# Patient Record
Sex: MALE | Race: WHITE | NOT HISPANIC OR LATINO | Employment: OTHER | URBAN - METROPOLITAN AREA
[De-identification: names, ages, dates, MRNs, and addresses within clinical notes are randomized per-mention and may not be internally consistent; named-entity substitution may affect disease eponyms.]

---

## 2024-05-23 ENCOUNTER — OFFICE VISIT (OUTPATIENT)
Dept: SURGICAL ONCOLOGY | Facility: CLINIC | Age: 64
End: 2024-05-23
Payer: COMMERCIAL

## 2024-05-23 VITALS
HEART RATE: 56 BPM | HEIGHT: 69 IN | WEIGHT: 206.38 LBS | DIASTOLIC BLOOD PRESSURE: 81 MMHG | SYSTOLIC BLOOD PRESSURE: 132 MMHG | BODY MASS INDEX: 30.57 KG/M2

## 2024-05-23 DIAGNOSIS — K64.5 INTERNAL THROMBOSED HEMORRHOIDS: Primary | ICD-10-CM

## 2024-05-23 DIAGNOSIS — K64.5 THROMBOSED HEMORRHOIDS: Primary | ICD-10-CM

## 2024-05-23 DIAGNOSIS — K64.5 THROMBOSED HEMORRHOIDS: ICD-10-CM

## 2024-05-23 PROCEDURE — 99999 PR PBB SHADOW E&M-EST. PATIENT-LVL III: CPT | Mod: PBBFAC,,, | Performed by: COLON & RECTAL SURGERY

## 2024-05-23 PROCEDURE — 3008F BODY MASS INDEX DOCD: CPT | Mod: CPTII,S$GLB,, | Performed by: COLON & RECTAL SURGERY

## 2024-05-23 PROCEDURE — 3075F SYST BP GE 130 - 139MM HG: CPT | Mod: CPTII,S$GLB,, | Performed by: COLON & RECTAL SURGERY

## 2024-05-23 PROCEDURE — 1160F RVW MEDS BY RX/DR IN RCRD: CPT | Mod: CPTII,S$GLB,, | Performed by: COLON & RECTAL SURGERY

## 2024-05-23 PROCEDURE — 1159F MED LIST DOCD IN RCRD: CPT | Mod: CPTII,S$GLB,, | Performed by: COLON & RECTAL SURGERY

## 2024-05-23 PROCEDURE — 3079F DIAST BP 80-89 MM HG: CPT | Mod: CPTII,S$GLB,, | Performed by: COLON & RECTAL SURGERY

## 2024-05-23 PROCEDURE — 99204 OFFICE O/P NEW MOD 45 MIN: CPT | Mod: S$GLB,,, | Performed by: COLON & RECTAL SURGERY

## 2024-05-23 RX ORDER — LIDOCAINE 50 MG/G
CREAM TOPICAL
COMMUNITY
Start: 2024-05-23

## 2024-05-23 RX ORDER — ROSUVASTATIN CALCIUM 40 MG/1
40 TABLET, COATED ORAL
COMMUNITY
Start: 2024-05-02

## 2024-05-23 RX ORDER — AMOXICILLIN 500 MG
CAPSULE ORAL DAILY
COMMUNITY

## 2024-05-23 RX ORDER — METOPROLOL SUCCINATE 25 MG/1
12.5 TABLET, EXTENDED RELEASE ORAL
COMMUNITY
Start: 2024-05-08

## 2024-05-23 RX ORDER — DICYCLOMINE HYDROCHLORIDE 10 MG/1
CAPSULE ORAL EVERY 6 HOURS PRN
COMMUNITY
Start: 2024-02-15

## 2024-05-23 RX ORDER — HYDROCORTISONE ACETATE 25 MG/1
25 SUPPOSITORY RECTAL 2 TIMES DAILY
Qty: 20 SUPPOSITORY | Refills: 0 | Status: SHIPPED | OUTPATIENT
Start: 2024-05-23 | End: 2024-06-02

## 2024-05-23 RX ORDER — TAMSULOSIN HYDROCHLORIDE 0.4 MG/1
1 CAPSULE ORAL
COMMUNITY
Start: 2024-03-05

## 2024-05-23 RX ORDER — NAPROXEN SODIUM 220 MG/1
81 TABLET, FILM COATED ORAL DAILY
COMMUNITY

## 2024-05-23 NOTE — PROGRESS NOTES
"   Patient ID: [id number]     Chief Complaint:  THROMBOSED HEMORRHOIDS      HPI:     Sami Olivarez is a 64 y.o. male here today for an initial consultation.  He was referred by Dr. Carlos A Vasquez for evaluation of a thrombosed hemorrhoid.  Tuesday night he began having throbbing rectal pain and noticed a perianal swelling.  He has been using creams and Sitz baths with a little improvement.  He denies bleeding.  He does not recall an inciting event.    Past Medical History:  has a past medical history of Hyperlipidemia and Hypertension.    Surgical History:  has a past surgical history that includes stents, iliac, bilateral and Cholecystectomy.    Family History: family history is not on file.    Social History:  reports that he has never smoked. He has never used smokeless tobacco.    Current Outpatient Medications   Medication Instructions    aspirin 81 mg, Oral, Daily    dicyclomine (BENTYL) 10 MG capsule Oral, Every 6 hours PRN    metoprolol succinate (TOPROL-XL) 12.5 mg, Oral    omega-3 fatty acids/fish oil (FISH OIL-OMEGA-3 FATTY ACIDS) 300-1,000 mg capsule Oral, Daily    RECTICARE 5 % Crea SMARTSIG:Sparingly T-DERMAL Every 3-4 Hours PRN    rosuvastatin (CRESTOR) 40 mg, Oral    tamsulosin (FLOMAX) 0.4 mg Cap 1 capsule, Oral       Patient has No Known Allergies.     Patient Care Team:  No, Primary Doctor as PCP - General       Subjective:     Review of Systems    12 point review of systems conducted, negative except as stated in the history of present illness. See HPI for details.      Objective:     Visit Vitals  /81   Pulse (!) 56   Ht 5' 9" (1.753 m)   Wt 93.6 kg (206 lb 6.4 oz)   BMI 30.48 kg/m²       Physical Exam    General: Alert and oriented, No acute distress.  Head: Normocephalic, Atraumatic.  Eye: Pupils are equal and round, Extraocular movements are intact, Sclera non-icteric.  Ears/Nose/Throat: Normal, Mucosa moist, Clear.  Respiratory: No wheezes, Non-labored respirations, Symmetrical chest " "wall expansion.  Cardiovascular: Regular rate.  Gastrointestinal: Soft, Non-tender, Non-distended, Normal bowel sounds, No palpable masses.  Rectal: Thrombosed left lateral internal hemorrhoid.  Integumentary: Warm, Dry, Intact, No rashes.  Extremities: No edema.  Neurologic: No focal deficits.  Psychiatric: Normal interaction, Coherent speech, Euthymic mood, Appropriate affect.       Labs Reviewed:     Chemistry:  No results found for: "NA", "K", "CHLORIDE", "BUN", "CREATININE", "EGFRNORACEVR", "GLUCOSE", "CALCIUM", "ALKPHOS", "LABPROT", "ALBUMIN", "BILIDIR", "IBILI", "AST", "ALT", "MG", "PHOS", "CEA"     Hematology:  No results found for: "WBC", "HGB", "HCT", "PLT"      Assessment:       ICD-10-CM ICD-9-CM   1. Internal thrombosed hemorrhoids  K64.5 455.1        Plan:     - No I&D.  Recommend medical management, will prescribe suppositories.  Excisional hemorrhoidectomy if doesn't improve or worsens.      No follow-ups on file. In addition to their scheduled follow up, the patient has also been instructed to follow up on as needed basis.     No future appointments.     Elías Borrero MD    "